# Patient Record
(demographics unavailable — no encounter records)

---

## 2024-12-20 NOTE — ASSESSMENT
[FreeTextEntry1] : "needs "   The patient is a 44y  Male  from 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   1) Seropositive Erosive RA:  with deformities, on essentially no tx since 2007.  Brief course of 20 mg MTX not tolerated... presented to ER -  with T to 105 and ESR > 100, CRP > 200... severe polyarticular synovitis with marked pain and limited ROM- most severe BL feet/ elbows, R hip (need to review images)..  Dc on 20 mg pred for several weeks, eventually started on MTX- tolerated 7.5 mg and increased after 1 m to 15 mg, still well tolerated with marked improvement overall with no obvious synovitis today.   Will increase to 20 mg wkly now..   BL R > L hips severely limited- likely secondary OA.   Will taper off prednisone and follow closely.  If arthropathy returns as tapers off prednisone will need to consider biologic tx.. will follow every 4-8 wks.. advised to call immediately if joint inflammation returned.  Will not decrease to 5 mg prednisone x 1 m then qod x 1 m then off.. May also be candidate for clinical trial.  Will reach out to research team.  Will need biologic tx most likely.. will need to apply for patient assistance given lack of insurance.   NOTE:  - high dose MTX not tolerated - CTscan chest 5/24 essentially negative   2) FUO:  T max 105, comprehensive ID w/u negative, though works as  - gardening, full tick borne studies were negative along with neg Bld Cx x 2 . Given 24 hs antibiotics - now off > 72 hs with no return.  Initially presented with cervical lymphadenopathy-> resolved within 2 day... no return    wt stable..  Tick borne studies neg  EBV early antigen/ and IGG + suggest possible infection... could explain diffuse cervical lymphadenopathy appreciated on initial exam-> now resolved.  Denies sore throat or other URI sx at time of fevers. NO fever  over past 2 m.   3) Secondary OA:  - BL hips:  R marked > L will refer to ortho as needed   Plan 12/20/24: - Increase MTX to 10 2.5mg tabs each week - Can still use 1 5mg tab Prednisone occasionally as necessary, but prefer he saves it to only use when swelling is visible - Consider starting HCQ, research - Give referral to ophthalmology - Follow-up in 4mo - call us beforehand if have bad flare that does not resolve on its own .    Plan - complete labs in combination w/ labs from the [facility]- prior to next visit   - please ask about health insurance at the [facility].   - Increase MTX to 8 tabs / wk...   - Continue taking prednisone at 1 tab daily.. for the next month. then on August 10 th try to lower to 1 tab every other day.  If after 1 wk the pain is too severe resume the 1 tab daily.    - return to office in 2 months and do labs 1 week before.  September 18th at 3:30

## 2024-12-20 NOTE — REASON FOR VISIT
[Follow-Up: _____] : a [unfilled] follow-up visit [Interpreters_IDNumber] : [id number] [Interpreters_FullName] : Rhianna

## 2024-12-20 NOTE — HISTORY OF PRESENT ILLNESS
[FreeTextEntry1] : 12/20/24 Last seen 6 m ago, self pay...  RA Claims that his pain has been significantly improved  taking his medicine every week 8 tabs 2.5mg MTX + Folic acid daily  1-2 tabs 5mg Prednisone "as needed" - 1 tab every day, 2 tabs ~once a week (average) Only pain to report today is L Achilles - no active swelling but pain with walking.  No c/o R hip pain.   Has never seen ophthalmologist  Labs 12/10 Normal CBC, CMP ESR 91   1) Seropositive RA 2) Secondary OA 3) Financial stress:  no insurance, self employed.  NO work, no pay   --------------------- 5/7/2024 Hospital f/u "definitely need  - he is feeling good w/ no joint pain or complaints  - currently has no insurance and can't get it due to issues w/his documentation.....reports he does work full time and pay for his prescriptions out of pocket [Note: reports his pharmacy has lower pricing for their medications] - has made an appointment w/ [facility] 5/21/2024....not sure exactly who he will see there  - denies any fevers, energy levels are high - currently on 20 mg of prednisone -hasn't felt this good in years.. tolerating without complications at this point  [Note: previously tried MTX 8 pills weekly and it caused severe nausea- less than 6 m and no retrial at any other dose.]  - has no family hx of RA....reports his family only has hx of diabetes - reports having joint pain since 2007  [Note: never took sulfasalazine or HCQ to his knowledge]  1) Seropositive RA:  High titer CCP, RF, ESr 99, -> 197  2) Hyponatremia  ___________________________________________________________________________________  Hospital Note: The patient is a 44y Male with significant PMH of polyarticular RA NOT well controlled, on no treatment for many years with active pain, stiffness and swelling in BL hands/ wrists, elbows, feet/ ankles.. x many years.   Adm 4/28/24 with fevers, chills, malaise and joint pain/ swelling.   Upon arrival in ED he was tachycardic with HR in 100s-110s and tachypneic with RR in upper 20s- but absolutely denies cough/ sob, chest pain, lymphadenopathy, night sweats, wt loss or fatigue.  Despite c/o continues to work FT outdoors as .  High risk for tick borne disease...  ROS: denies chest pain, palpitation, N/V, abdominal  pain, diarrhea or dysuria.  He denies any insect or tick bite (known).  Denies obvious exposure to TB now or in [place]  (in [place] for 20+ ys, ? last travel) SH: , 1 adult son.  Day , primary language [place]ian from [place]  Rheum: has not seen rheum > 3 ys was following in [place] with brief course of MTX - not tolerated severe GI distress and not continued.  Denies sicca, RP, cardiopulmonary complications, no rash/ vasculitis. CXR negative for any acute infiltrates. NS 2500 cc bolus and Rocephin 1 gm IV given in ED- with minimal fever since adm.. T max past 24 hs 99.1. CBC with NO increase in WBC  ESR 88-> 99 w/ -> 137  and neg urine/ blood cultures  Tick borne studies thus far negative  EBV early antigen/ and IGG + suggest possible infection... could explain diffuse cervical lymphadenopathy appreciated on initial exam-> now resolved.  Denies sore throat or other URI sx at time of fevers. NO fever > 48 hs now.   Discussion:   - little on exam today to suggest clear explanation for infectious source, though VERY high risk for tick borne disease of many types, serologies pending- NTD. In meantime:  RA poorly controlled and could certainly explain inflammatory markers... needs to be addressed once acute infectious process is eliminated.   - Despite considerable synovitis, is not complaining and need for immediate intervention is not essential. Will await ID w/u before initiating therapy.   - Concerned about lymphadenopathy especially L supraclavicular.. and long hx of poorly treated RA... but CTScan chest with and without contrast was negative.   - Now stable x 24-48 hs without fever,  He needs to establish care with rheumatology before discharge.

## 2024-12-20 NOTE — DATA REVIEWED
[FreeTextEntry1] : 5/1/24:  CBC with NO increase in WBC, ESR 88-> 99 w/ -> 137  and neg urine/ blood cultures  Tick borne studies neg, HIV, Hep / QFT neg EBV early antigen/ and IGG + suggest possible infection...

## 2024-12-20 NOTE — REVIEW OF SYSTEMS
[As noted in HPI] : as noted in HPI [Heart Rate Is Fast] : fast heart rate [As Noted in HPI] : as noted in HPI [Arthralgias] : arthralgias [Joint Pain] : joint pain [Joint Swelling] : joint swelling [Joint Stiffness] : joint stiffness [Negative] : Heme/Lymph [Eye Pain] : no eye pain [Red Eyes] : eyes not red [Eyesight Problems] : no eyesight problems [Dry Eyes] : no dryness of the eyes [FreeTextEntry2] : no fever since last visit -initially presented to  with T 105- FUO- no clear infectious process. much improved - with steroids / MTX and now  energy level excellent, wt down 8 lbs;  no hospitalizations since last visit [FreeTextEntry3] : denies inflammatory eye / or dryness- has not seen opth [FreeTextEntry7] : despite MTX at 20 mg - now well tolerated (reported poor tolerance in past) [FreeTextEntry5] : upon initial hosp eval 100-110 - now back to nl  [FreeTextEntry9] : ...function back to working FT - no active synovitis [de-identified] : no rash

## 2024-12-20 NOTE — PHYSICAL EXAM
[General Appearance - Alert] : alert [General Appearance - In No Acute Distress] : in no acute distress [Sclera] : the sclera and conjunctiva were normal [PERRL With Normal Accommodation] : pupils were equal in size, round, and reactive to light [Extraocular Movements] : extraocular movements were intact [Outer Ear] : the ears and nose were normal in appearance [Nasal Cavity] : the nasal mucosa and septum were normal [Oropharynx] : the oropharynx was normal [Neck Appearance] : the appearance of the neck was normal [Respiration, Rhythm And Depth] : normal respiratory rhythm and effort [Auscultation Breath Sounds / Voice Sounds] : lungs were clear to auscultation bilaterally [Heart Rate And Rhythm] : heart rate was normal and rhythm regular [Heart Sounds] : normal S1 and S2 [Heart Sounds Gallop] : no gallops [Murmurs] : no murmurs [Heart Sounds Pericardial Friction Rub] : no pericardial rub [Edema] : there was no peripheral edema [Cervical Lymph Nodes Enlarged Posterior Bilaterally] : posterior cervical [Cervical Lymph Nodes Enlarged Anterior Bilaterally] : anterior cervical [Supraclavicular Lymph Nodes Enlarged Bilaterally] : supraclavicular [No CVA Tenderness] : no ~M costovertebral angle tenderness [No Spinal Tenderness] : no spinal tenderness [Abnormal Walk] : normal gait [Skin Color & Pigmentation] : normal skin color and pigmentation [Skin Turgor] : normal skin turgor [] : no rash [Sensation] : the sensory exam was normal to light touch and pinprick [No Focal Deficits] : no focal deficits [Oriented To Time, Place, And Person] : oriented to person, place, and time [Impaired Insight] : insight and judgment were intact [Affect] : the affect was normal [FreeTextEntry1] : calm, appropriate and demonstrates good understanding of plans/ condition

## 2025-04-20 NOTE — HISTORY OF PRESENT ILLNESS
[FreeTextEntry1] : 4/15/25  Patient is doing well On 20mg weekly MTX with folic acid - working well but claims that MTX causes excessive salivation for a moment, though not at all sustained. Also takes 5mg Prednisone once daily, tolerated well Complains that R hip hurts when he walks too much - known severe DJD on R - moderate on L  No infections since last visit Functionally doing well, able to work FT.    Labs 12/10/24 Normal CBC, CMP w/ Alk phos 133  ESR 91   1) Seropositive RA 2) Secondary OA 3) Financial stress:  no insurance, self employed.  NO work, no pay   --------------------- 5/7/2024 Hospital f/u "definitely need  - he is feeling good w/ no joint pain or complaints  - currently has no insurance and can't get it due to issues w/his documentation.....reports he does work full time and pay for his prescriptions out of pocket [Note: reports his pharmacy has lower pricing for their medications] - has made an appointment w/ [facility] 5/21/2024....not sure exactly who he will see there  - denies any fevers, energy levels are high - currently on 20 mg of prednisone -hasn't felt this good in years.. tolerating without complications at this point  [Note: previously tried MTX 8 pills weekly and it caused severe nausea- less than 6 m and no retrial at any other dose.]  - has no family hx of RA....reports his family only has hx of diabetes - reports having joint pain since 2007  [Note: never took sulfasalazine or HCQ to his knowledge]  1) Seropositive RA:  High titer CCP, RF, ESr 99, -> 197  2) Hyponatremia  ___________________________________________________________________________________  Hospital Note: The patient is a 44y Male with significant PMH of polyarticular RA NOT well controlled, on no treatment for many years with active pain, stiffness and swelling in BL hands/ wrists, elbows, feet/ ankles.. x many years.   Adm 4/28/24 with fevers, chills, malaise and joint pain/ swelling.   Upon arrival in ED he was tachycardic with HR in 100s-110s and tachypneic with RR in upper 20s- but absolutely denies cough/ sob, chest pain, lymphadenopathy, night sweats, wt loss or fatigue.  Despite c/o continues to work FT outdoors as .  High risk for tick borne disease...  ROS: denies chest pain, palpitation, N/V, abdominal  pain, diarrhea or dysuria.  He denies any insect or tick bite (known).  Denies obvious exposure to TB now or in [place]  (in [place] for 20+ ys, ? last travel) SH: , 1 adult son.  Day , primary language [place]ian from [place]  Rheum: has not seen rheum > 3 ys was following in [place] with brief course of MTX - not tolerated severe GI distress and not continued.  Denies sicca, RP, cardiopulmonary complications, no rash/ vasculitis. CXR negative for any acute infiltrates. NS 2500 cc bolus and Rocephin 1 gm IV given in ED- with minimal fever since adm.. T max past 24 hs 99.1. CBC with NO increase in WBC  ESR 88-> 99 w/ -> 137  and neg urine/ blood cultures  Tick borne studies thus far negative  EBV early antigen/ and IGG + suggest possible infection... could explain diffuse cervical lymphadenopathy appreciated on initial exam-> now resolved.  Denies sore throat or other URI sx at time of fevers. NO fever > 48 hs now.   Discussion:   - little on exam today to suggest clear explanation for infectious source, though VERY high risk for tick borne disease of many types, serologies pending- NTD. In meantime:  RA poorly controlled and could certainly explain inflammatory markers... needs to be addressed once acute infectious process is eliminated.   - Despite considerable synovitis, is not complaining and need for immediate intervention is not essential. Will await ID w/u before initiating therapy.   - Concerned about lymphadenopathy especially L supraclavicular.. and long hx of poorly treated RA... but CTScan chest with and without contrast was negative.   - Now stable x 24-48 hs without fever,  He needs to establish care with rheumatology before discharge.

## 2025-04-20 NOTE — DATA REVIEWED
[FreeTextEntry1] : 5/1/24:  CBC with NO increase in WBC, ESR 88-> 99 w/ -> 137  and neg urine/ blood cultures  Tick borne studies neg, HIV, Hep / QFT neg EBV early antigen/ and IGG + suggest possible infection...  X-rays 12/24 Severe DJD R side, bone on bone. L moderate to severe

## 2025-04-20 NOTE — HISTORY OF PRESENT ILLNESS
[FreeTextEntry1] : 4/15/25  Patient is doing well On 20mg weekly MTX with folic acid - working well but claims that MTX causes excessive salivation for a moment, though not at all sustained. Also takes 5mg Prednisone once daily, tolerated well Complains that R hip hurts when he walks too much - known severe DJD on R - moderate on L  No infections since last visit Functionally doing well, able to work FT.    Labs 12/10/24 Normal CBC, CMP w/ Alk phos 133  ESR 91   1) Seropositive RA 2) Secondary OA 3) Financial stress:  no insurance, self employed.  NO work, no pay   --------------------- 5/7/2024 Hospital f/u "definitely need  - he is feeling good w/ no joint pain or complaints  - currently has no insurance and can't get it due to issues w/his documentation.....reports he does work full time and pay for his prescriptions out of pocket [Note: reports his pharmacy has lower pricing for their medications] - has made an appointment w/ [facility] 5/21/2024....not sure exactly who he will see there  - denies any fevers, energy levels are high - currently on 20 mg of prednisone -hasn't felt this good in years.. tolerating without complications at this point  [Note: previously tried MTX 8 pills weekly and it caused severe nausea- less than 6 m and no retrial at any other dose.]  - has no family hx of RA....reports his family only has hx of diabetes - reports having joint pain since 2007  [Note: never took sulfasalazine or HCQ to his knowledge]  1) Seropositive RA:  High titer CCP, RF, ESr 99, -> 197  2) Hyponatremia  ___________________________________________________________________________________  Hospital Note: The patient is a 44y Male with significant PMH of polyarticular RA NOT well controlled, on no treatment for many years with active pain, stiffness and swelling in BL hands/ wrists, elbows, feet/ ankles.. x many years.   Adm 4/28/24 with fevers, chills, malaise and joint pain/ swelling.   Upon arrival in ED he was tachycardic with HR in 100s-110s and tachypneic with RR in upper 20s- but absolutely denies cough/ sob, chest pain, lymphadenopathy, night sweats, wt loss or fatigue.  Despite c/o continues to work FT outdoors as .  High risk for tick borne disease...  ROS: denies chest pain, palpitation, N/V, abdominal  pain, diarrhea or dysuria.  He denies any insect or tick bite (known).  Denies obvious exposure to TB now or in [place]  (in [place] for 20+ ys, ? last travel) SH: , 1 adult son.  Day , primary language [place] from [place]  Rheum: has not seen rheum > 3 ys was following in [place] with brief course of MTX - not tolerated severe GI distress and not continued.  Denies sicca, RP, cardiopulmonary complications, no rash/ vasculitis. CXR negative for any acute infiltrates. NS 2500 cc bolus and Rocephin 1 gm IV given in ED- with minimal fever since adm.. T max past 24 hs 99.1. CBC with NO increase in WBC  ESR 88-> 99 w/ -> 137  and neg urine/ blood cultures  Tick borne studies thus far negative  EBV early antigen/ and IGG + suggest possible infection... could explain diffuse cervical lymphadenopathy appreciated on initial exam-> now resolved.  Denies sore throat or other URI sx at time of fevers. NO fever > 48 hs now.   Discussion:   - little on exam today to suggest clear explanation for infectious source, though VERY high risk for tick borne disease of many types, serologies pending- NTD. In meantime:  RA poorly controlled and could certainly explain inflammatory markers... needs to be addressed once acute infectious process is eliminated.   - Despite considerable synovitis, is not complaining and need for immediate intervention is not essential. Will await ID w/u before initiating therapy.   - Concerned about lymphadenopathy especially L supraclavicular.. and long hx of poorly treated RA... but CTScan chest with and without contrast was negative.   - Now stable x 24-48 hs without fever,  He needs to establish care with rheumatology before discharge.

## 2025-04-20 NOTE — PHYSICAL EXAM
[General Appearance - Alert] : alert [General Appearance - In No Acute Distress] : in no acute distress [Sclera] : the sclera and conjunctiva were normal [PERRL With Normal Accommodation] : pupils were equal in size, round, and reactive to light [Extraocular Movements] : extraocular movements were intact [Outer Ear] : the ears and nose were normal in appearance [Nasal Cavity] : the nasal mucosa and septum were normal [Neck Appearance] : the appearance of the neck was normal [Oropharynx] : the oropharynx was normal [Heart Rate And Rhythm] : heart rate was normal and rhythm regular [Murmurs] : no murmurs [Heart Sounds Pericardial Friction Rub] : no pericardial rub [Edema] : there was no peripheral edema [Cervical Lymph Nodes Enlarged Posterior Bilaterally] : posterior cervical [Cervical Lymph Nodes Enlarged Anterior Bilaterally] : anterior cervical [Supraclavicular Lymph Nodes Enlarged Bilaterally] : supraclavicular [No CVA Tenderness] : no ~M costovertebral angle tenderness [No Spinal Tenderness] : no spinal tenderness [Abnormal Walk] : normal gait [Skin Color & Pigmentation] : normal skin color and pigmentation [Skin Turgor] : normal skin turgor [] : no rash [Sensation] : the sensory exam was normal to light touch and pinprick [No Focal Deficits] : no focal deficits [Impaired Insight] : insight and judgment were intact [Oriented To Time, Place, And Person] : oriented to person, place, and time [Affect] : the affect was normal [FreeTextEntry1] : calm, appropriate and demonstrates good understanding of plans/condition

## 2025-04-20 NOTE — PHYSICAL EXAM
[General Appearance - Alert] : alert [General Appearance - In No Acute Distress] : in no acute distress [Sclera] : the sclera and conjunctiva were normal [PERRL With Normal Accommodation] : pupils were equal in size, round, and reactive to light [Extraocular Movements] : extraocular movements were intact [Outer Ear] : the ears and nose were normal in appearance [Nasal Cavity] : the nasal mucosa and septum were normal [Neck Appearance] : the appearance of the neck was normal [Oropharynx] : the oropharynx was normal [Heart Rate And Rhythm] : heart rate was normal and rhythm regular [Murmurs] : no murmurs [Heart Sounds Pericardial Friction Rub] : no pericardial rub [Edema] : there was no peripheral edema [Cervical Lymph Nodes Enlarged Posterior Bilaterally] : posterior cervical [Cervical Lymph Nodes Enlarged Anterior Bilaterally] : anterior cervical [Supraclavicular Lymph Nodes Enlarged Bilaterally] : supraclavicular [No CVA Tenderness] : no ~M costovertebral angle tenderness [No Spinal Tenderness] : no spinal tenderness [Abnormal Walk] : normal gait [Skin Color & Pigmentation] : normal skin color and pigmentation [Skin Turgor] : normal skin turgor [] : no rash [Sensation] : the sensory exam was normal to light touch and pinprick [No Focal Deficits] : no focal deficits [Oriented To Time, Place, And Person] : oriented to person, place, and time [Impaired Insight] : insight and judgment were intact [Affect] : the affect was normal [FreeTextEntry1] : calm, appropriate and demonstrates good understanding of plans/condition

## 2025-04-20 NOTE — ASSESSMENT
[FreeTextEntry1] : "needs "   The patient is a 45y Male from [place] with significant PMH of polyarticular seropositive RA and secondary OA several joints - most severe BL hips R > L.   1) Seropositive Erosive RA:  with multiple deformities, on essentially no tx x many ys since 2007.  Brief course of 20 mg MTX not tolerated (ys ago)... presented to ER -  with T to 105 and ESR > 100, CRP > 200... severe polyarticular synovitis with marked pain and limited ROM- most severe BL feet/ elbows, R hip (need to review images)..  Now on 20 mg MTX and tapered pred to 5 mg every day.. no return of acute severe synovitis... though R hip remains painful and limited ROM- this is not likely inflammatory but secondary FM.   Advised now to lower to 5 mg every other day for 2 wks, if tolerated without increase in joint pain or stiffness, can then stop completely.  BL R > L hips severely limited- likely secondary OA.   Will taper off prednisone and follow closely- now.. Emphasized need to call immediately if joint sx worsen.  Would need to add DMARD- HCQ / or SSA if not tolerated.     May also be candidate for clinical trial.  Will reach out to research team....    NOTE:  - high dose MTX not tolerated - CTscan chest 5/24 essentially negative   2) FUO: NO fever now since dc from hospital.  T max 105, comprehensive ID w/u negative, though works as  - gardening, full tick borne studies were negative along with neg Bld Cx x 2 . Given 24 hs antibiotics - now off > 72 hs with no return.  Initially presented with cervical lymphadenopathy-> resolved within 2 day... no return    wt stable..  Tick borne studies neg  EBV early antigen/ and IGG + suggest possible infection... could explain diffuse cervical lymphadenopathy appreciated on initial exam-> now resolved.  Denies sore throat or other URI sx at time of fevers. NO fever  over past 2 m.   3) Secondary OA:  - BL hips:  R marked > L will refer to ortho as needed   Plan 4/15/25: - Reduce 5mg Prednisone to once every other day - if inflammation returns, can revert to daily.. if tolerated after 2-4 wk can try to stop.    - Follow-up in 6mo - new labs beforehand (kidneys, liver, blood counts)

## 2025-04-20 NOTE — REVIEW OF SYSTEMS
[As noted in HPI] : as noted in HPI [Heart Rate Is Fast] : fast heart rate [As Noted in HPI] : as noted in HPI [Arthralgias] : arthralgias [Joint Pain] : joint pain [Joint Swelling] : joint swelling [Joint Stiffness] : joint stiffness [Negative] : Heme/Lymph [Eye Pain] : no eye pain [Red Eyes] : eyes not red [Eyesight Problems] : no eyesight problems [Dry Eyes] : no dryness of the eyes [FreeTextEntry2] : no fever since last visit.  -initially presented to  with T 105- FUO- no clear infectious process. much improved - with steroids / MTX and now  energy level excellent, wt down 8 lbs;  no hospitalizations since last visit [FreeTextEntry3] : denies inflammatory eye / or dryness- has not seen opth [FreeTextEntry5] : upon initial hosp eval 100-110 - now back to nl (101 today) [FreeTextEntry7] : despite MTX at 20 mg - now well tolerated (reported poor tolerance in past) [FreeTextEntry9] : ...function back to working FT - no active synovitis [de-identified] : no rash

## 2025-04-20 NOTE — REASON FOR VISIT
[Follow-Up: _____] : a [unfilled] follow-up visit [Interpreters_IDNumber] : [id number] [Interpreters_FullName] : Atul

## 2025-04-20 NOTE — REASON FOR VISIT
[Follow-Up: _____] : a [unfilled] follow-up visit [Interpreters_IDNumber] : [id number] [Interpreters_FullName] : Atul

## 2025-04-20 NOTE — ASSESSMENT
[FreeTextEntry1] : "needs "   The patient is a 45y Male from [place] with significant PMH of polyarticular seropositive RA and secondary OA several joints - most severe BL hips R > L.   1) Seropositive Erosive RA:  with multiple deformities, on essentially no tx x many ys since 2007.  Brief course of 20 mg MTX not tolerated (ys ago)... presented to ER -  with T to 105 and ESR > 100, CRP > 200... severe polyarticular synovitis with marked pain and limited ROM- most severe BL feet/ elbows, R hip (need to review images)..  Now on 20 mg MTX and tapered pred to 5 mg every day.. no return of acute severe synovitis... though R hip remains painful and limited ROM- this is not likely inflammatory but secondary FM.   Advised now to lower to 5 mg every other day for 2 wks, if tolerated without increase in joint pain or stiffness, can then stop completely.  BL R > L hips severely limited- likely secondary OA.   Will taper off prednisone and follow closely- now.. Emphasized need to call immediately if joint sx worsen.  Would need to add DMARD- HCQ / or SSA if not tolerated.     May also be candidate for clinical trial.  Will reach out to research team....    NOTE:  - high dose MTX not tolerated - CTscan chest 5/24 essentially negative   2) FUO: NO fever now since dc from hospital.  T max 105, comprehensive ID w/u negative, though works as  - gardening, full tick borne studies were negative along with neg Bld Cx x 2 . Given 24 hs antibiotics - now off > 72 hs with no return.  Initially presented with cervical lymphadenopathy-> resolved within 2 day... no return    wt stable..  Tick borne studies neg  EBV early antigen/ and IGG + suggest possible infection... could explain diffuse cervical lymphadenopathy appreciated on initial exam-> now resolved.  Denies sore throat or other URI sx at time of fevers. NO fever  over past 2 m.   3) Secondary OA:  - BL hips:  R marked > L will refer to ortho as needed   Plan 4/15/25: - Reduce 5mg Prednisone to once every other day - if inflammation returns, can revert to daily.. if tolerated after 2-4 wk can try to stop.    - Follow-up in 6mo - new labs beforehand (kidneys, liver, blood counts)

## 2025-04-20 NOTE — REVIEW OF SYSTEMS
[As noted in HPI] : as noted in HPI [Heart Rate Is Fast] : fast heart rate [As Noted in HPI] : as noted in HPI [Arthralgias] : arthralgias [Joint Pain] : joint pain [Joint Swelling] : joint swelling [Joint Stiffness] : joint stiffness [Negative] : Heme/Lymph [Eye Pain] : no eye pain [Red Eyes] : eyes not red [Eyesight Problems] : no eyesight problems [Dry Eyes] : no dryness of the eyes [FreeTextEntry2] : no fever since last visit.  -initially presented to  with T 105- FUO- no clear infectious process. much improved - with steroids / MTX and now  energy level excellent, wt down 8 lbs;  no hospitalizations since last visit [FreeTextEntry3] : denies inflammatory eye / or dryness- has not seen opth [FreeTextEntry5] : upon initial hosp eval 100-110 - now back to nl (101 today) [FreeTextEntry7] : despite MTX at 20 mg - now well tolerated (reported poor tolerance in past) [FreeTextEntry9] : ...function back to working FT - no active synovitis [de-identified] : no rash